# Patient Record
Sex: FEMALE | Race: OTHER | NOT HISPANIC OR LATINO | Employment: FULL TIME | URBAN - METROPOLITAN AREA
[De-identification: names, ages, dates, MRNs, and addresses within clinical notes are randomized per-mention and may not be internally consistent; named-entity substitution may affect disease eponyms.]

---

## 2021-10-14 ENCOUNTER — HOSPITAL ENCOUNTER (EMERGENCY)
Facility: MEDICAL CENTER | Age: 56
End: 2021-10-14
Attending: EMERGENCY MEDICINE
Payer: OTHER GOVERNMENT

## 2021-10-14 VITALS
TEMPERATURE: 97.4 F | OXYGEN SATURATION: 97 % | RESPIRATION RATE: 18 BRPM | SYSTOLIC BLOOD PRESSURE: 159 MMHG | DIASTOLIC BLOOD PRESSURE: 83 MMHG | HEIGHT: 72 IN | BODY MASS INDEX: 28.13 KG/M2 | WEIGHT: 207.67 LBS | HEART RATE: 61 BPM

## 2021-10-14 DIAGNOSIS — I82.492 ACUTE DEEP VEIN THROMBOSIS (DVT) OF OTHER SPECIFIED VEIN OF LEFT LOWER EXTREMITY (HCC): ICD-10-CM

## 2021-10-14 LAB
ALBUMIN SERPL BCP-MCNC: 3.8 G/DL (ref 3.2–4.9)
ALBUMIN/GLOB SERPL: 2.1 G/DL
ALP SERPL-CCNC: 103 U/L (ref 30–99)
ALT SERPL-CCNC: 24 U/L (ref 2–50)
ANION GAP SERPL CALC-SCNC: 15 MMOL/L (ref 7–16)
APTT PPP: 26.1 SEC (ref 24.7–36)
AST SERPL-CCNC: 22 U/L (ref 12–45)
BASOPHILS # BLD AUTO: 0.4 % (ref 0–1.8)
BASOPHILS # BLD: 0.03 K/UL (ref 0–0.12)
BILIRUB SERPL-MCNC: 0.7 MG/DL (ref 0.1–1.5)
BUN SERPL-MCNC: 14 MG/DL (ref 8–22)
CALCIUM SERPL-MCNC: 9.5 MG/DL (ref 8.4–10.2)
CHLORIDE SERPL-SCNC: 107 MMOL/L (ref 96–112)
CO2 SERPL-SCNC: 20 MMOL/L (ref 20–33)
CREAT SERPL-MCNC: 0.9 MG/DL (ref 0.5–1.4)
EOSINOPHIL # BLD AUTO: 0.3 K/UL (ref 0–0.51)
EOSINOPHIL NFR BLD: 4.2 % (ref 0–6.9)
ERYTHROCYTE [DISTWIDTH] IN BLOOD BY AUTOMATED COUNT: 57.2 FL (ref 35.9–50)
GLOBULIN SER CALC-MCNC: 1.8 G/DL (ref 1.9–3.5)
GLUCOSE SERPL-MCNC: 90 MG/DL (ref 65–99)
HCT VFR BLD AUTO: 39.9 % (ref 37–47)
HGB BLD-MCNC: 13.1 G/DL (ref 12–16)
IMM GRANULOCYTES # BLD AUTO: 0.03 K/UL (ref 0–0.11)
IMM GRANULOCYTES NFR BLD AUTO: 0.4 % (ref 0–0.9)
INR PPP: 1.07 (ref 0.87–1.13)
LYMPHOCYTES # BLD AUTO: 2.57 K/UL (ref 1–4.8)
LYMPHOCYTES NFR BLD: 36 % (ref 22–41)
MCH RBC QN AUTO: 30.8 PG (ref 27–33)
MCHC RBC AUTO-ENTMCNC: 32.8 G/DL (ref 33.6–35)
MCV RBC AUTO: 93.9 FL (ref 81.4–97.8)
MONOCYTES # BLD AUTO: 0.69 K/UL (ref 0–0.85)
MONOCYTES NFR BLD AUTO: 9.7 % (ref 0–13.4)
NEUTROPHILS # BLD AUTO: 3.51 K/UL (ref 2–7.15)
NEUTROPHILS NFR BLD: 49.3 % (ref 44–72)
NRBC # BLD AUTO: 0 K/UL
NRBC BLD-RTO: 0 /100 WBC
PLATELET # BLD AUTO: 235 K/UL (ref 164–446)
PMV BLD AUTO: 9.7 FL (ref 9–12.9)
POTASSIUM SERPL-SCNC: 4 MMOL/L (ref 3.6–5.5)
PROT SERPL-MCNC: 5.6 G/DL (ref 6–8.2)
PROTHROMBIN TIME: 13.1 SEC (ref 12–14.6)
RBC # BLD AUTO: 4.25 M/UL (ref 4.2–5.4)
SODIUM SERPL-SCNC: 142 MMOL/L (ref 135–145)
WBC # BLD AUTO: 7.1 K/UL (ref 4.8–10.8)

## 2021-10-14 PROCEDURE — 85730 THROMBOPLASTIN TIME PARTIAL: CPT

## 2021-10-14 PROCEDURE — 36415 COLL VENOUS BLD VENIPUNCTURE: CPT

## 2021-10-14 PROCEDURE — 80053 COMPREHEN METABOLIC PANEL: CPT

## 2021-10-14 PROCEDURE — 85610 PROTHROMBIN TIME: CPT

## 2021-10-14 PROCEDURE — 85025 COMPLETE CBC W/AUTO DIFF WBC: CPT

## 2021-10-14 PROCEDURE — 99283 EMERGENCY DEPT VISIT LOW MDM: CPT

## 2021-10-14 NOTE — ED TRIAGE NOTES
"Chief Complaint   Patient presents with   • Deep Vein Thrombosis     L side leg, located in groin, had US done at [facility] today that showed DVT; sent here for RX for blood thinners     /83   Pulse 61   Temp 36.3 °C (97.4 °F) (Temporal)   Resp 18   Ht 1.88 m (6' 2\")   Wt 94.2 kg (207 lb 10.8 oz)   SpO2 97%   BMI 26.66 kg/m²     Pt arrived w/ above concern.    Has this patient been vaccinated for COVID - NO  If not, would they like to be vaccinated while in the ER if eligible?  no  Would the patient like to speak with the ERP about the possibility of receiving the COVID vaccine today before making a decision? no    "

## 2021-10-14 NOTE — ED NOTES
"Pt pacing in room, stated \"has to be somewhere at a specific time.\"    Aware waiting for eval by ERP.   "

## 2021-10-14 NOTE — DISCHARGE INSTRUCTIONS
Deep Vein Thrombosis    Deep vein thrombosis (DVT) is a condition in which a blood clot forms in a deep vein, such as a lower leg, thigh, or arm vein. A clot is blood that has thickened into a gel or solid. This condition is dangerous. It can lead to serious and even life-threatening complications if the clot travels to the lungs and causes a blockage (pulmonary embolism). It can also damage veins in the leg. This can result in leg pain, swelling, discoloration, and sores (post-thrombotic syndrome).  What are the causes?  This condition may be caused by:  · A slowdown of blood flow.  · Damage to a vein.  · A condition that causes blood to clot more easily, such as an inherited clotting disorder.  What increases the risk?  The following factors may make you more likely to develop this condition:  · Being overweight.  · Being older, especially over age 60.  · Sitting or lying down for more than four hours.  · Being in the hospital.  · Lack of physical activity (sedentary lifestyle).  · Pregnancy, being in childbirth, or having recently given birth.  · Taking medicines that contain estrogen, such as medicines to prevent pregnancy.  · Smoking.  · A history of any of the following:  ? Blood clots or a blood clotting disease.  ? Peripheral vascular disease.  ? Inflammatory bowel disease.  ? Cancer.  ? Heart disease.  ? Genetic conditions that affect how your blood clots, such as Factor V Leiden mutation.  ? Neurological diseases that affect your legs (leg paresis).  ? A recent injury, such as a car accident.  ? Major or lengthy surgery.  ? A central line placed inside a large vein.  What are the signs or symptoms?  Symptoms of this condition include:  · Swelling, pain, or tenderness in an arm or leg.  · Warmth, redness, or discoloration in an arm or leg.  If the clot is in your leg, symptoms may be more noticeable or worse when you stand or walk. Some people may not develop any symptoms.  How is this diagnosed?  This  condition is diagnosed with:  · A medical history and physical exam.  · Tests, such as:  ? Blood tests. These are done to check how well your blood clots.  ? Ultrasound. This is done to check for clots.  ? Venogram. For this test, contrast dye is injected into a vein and X-rays are taken to check for any clots.  How is this treated?  Treatment for this condition depends on:  · The cause of your DVT.  · Your risk for bleeding or developing more clots.  · Any other medical conditions that you have.  Treatment may include:  · Taking a blood thinner (anticoagulant). This type of medicine prevents clots from forming. It may be taken by mouth, injected under the skin, or injected through an IV (catheter).  · Injecting clot-dissolving medicines into the affected vein (catheter-directed thrombolysis).  · Having surgery. Surgery may be done to:  ? Remove the clot.  ? Place a filter in a large vein to catch blood clots before they reach the lungs.  Some treatments may be continued for up to six months.  Follow these instructions at home:  If you are taking blood thinners:  · Take the medicine exactly as told by your health care provider. Some blood thinners need to be taken at the same time every day. Do not skip a dose.  · Talk with your health care provider before you take any medicines that contain aspirin or NSAIDs. These medicines increase your risk for dangerous bleeding.  · Ask your health care provider about foods and drugs that could change the way the medicine works (may interact). Avoid those things if your health care provider tells you to do so.  · Blood thinners can cause easy bruising and may make it difficult to stop bleeding. Because of this:  ? Be very careful when using knives, scissors, or other sharp objects.  ? Use an electric razor instead of a blade.  ? Avoid activities that could cause injury or bruising, and follow instructions about how to prevent falls.  · Wear a medical alert bracelet or carry a  card that lists what medicines you take.  General instructions  · Take over-the-counter and prescription medicines only as told by your health care provider.  · Return to your normal activities as told by your health care provider. Ask your health care provider what activities are safe for you.  · Wear compression stockings if recommended by your health care provider.  · Keep all follow-up visits as told by your health care provider. This is important.  How is this prevented?  To lower your risk of developing this condition again:  · For 30 or more minutes every day, do an activity that:  ? Involves moving your arms and legs.  ? Increases your heart rate.  · When traveling for longer than four hours:  ? Exercise your arms and legs every hour.  ? Drink plenty of water.  ? Avoid drinking alcohol.  · Avoid sitting or lying for a long time without moving your legs.  · If you have surgery or you are hospitalized, ask about ways to prevent blood clots. These may include taking frequent walks or using anticoagulants.  · Stay at a healthy weight.  · If you are a woman who is older than age 35, avoid unnecessary use of medicines that contain estrogen, such as some birth control pills.  · Do not use any products that contain nicotine or tobacco, such as cigarettes and e-cigarettes. This is especially important if you take estrogen medicines. If you need help quitting, ask your health care provider.  Contact a health care provider if:  · You miss a dose of your blood thinner.  · Your menstrual period is heavier than usual.  · You have unusual bruising.  Get help right away if:  · You have:  ? New or increased pain, swelling, or redness in an arm or leg.  ? Numbness or tingling in an arm or leg.  ? Shortness of breath.  ? Chest pain.  ? A rapid or irregular heartbeat.  ? A severe headache or confusion.  ? A cut that will not stop bleeding.  · There is blood in your vomit, stool, or urine.  · You have a serious fall or accident,  or you hit your head.  · You feel light-headed or dizzy.  · You cough up blood.  These symptoms may represent a serious problem that is an emergency. Do not wait to see if the symptoms will go away. Get medical help right away. Call your local emergency services (911 in the [place] Do not drive yourself to the hospital.  Summary  · Deep vein thrombosis (DVT) is a condition in which a blood clot forms in a deep vein, such as a lower leg, thigh, or arm vein.  · Symptoms can include swelling, warmth, pain, and redness in your leg or arm.  · This condition may be treated with a blood thinner (anticoagulant medicine), medicine that is injected to dissolve blood clots,compression stockings, or surgery.  · If you are prescribed blood thinners, take them exactly as told.  This information is not intended to replace advice given to you by your health care provider. Make sure you discuss any questions you have with your health care provider.  Document Released: 12/18/2006 Document Revised: 11/30/2018 Document Reviewed: 05/18/2018  [employer] Patient Education © 2020 [employer]

## 2021-10-14 NOTE — ED NOTES
Pt stated will stay long enough to have blood drawn, will leave ED and return in am discuss starting on Xarelto.  ERP aware of POC.  Pt to sign out AMA after blood drawn.

## 2021-10-14 NOTE — ED PROVIDER NOTES
"ED Provider Note    CHIEF COMPLAINT  Chief Complaint   Patient presents with   • Deep Vein Thrombosis     L side leg, located in groin, had US done at [facility] today that showed DVT; sent here for RX for blood thinners       HPI  Angela Corey is a 56 y.o. female who presents stating that she developed Covid over the last month, though symptoms have resolved.  Then she reports she had leg swelling 2 weeks ago that is improved slightly.  She denies chest pain or shortness of breath.  She went to [facility] today and there was diagnosed with extensive DVT of the left lower extremity.  The patient comes to our ER complaining of leg swelling, no chest pain, no hemoptysis, no shortness of breath.  She has never had a blood clot previously.    REVIEW OF SYSTEMS  See HPI for further details. All other systems are negative.     PAST MEDICAL HISTORY   Covid last month    SOCIAL HISTORY  Social History     Tobacco Use   • Smoking status: Never Smoker   • Smokeless tobacco: Never Used   Substance and Sexual Activity   • Alcohol use: Not Currently   • Drug use: Never   • Sexual activity: Not on file       SURGICAL HISTORY  patient denies any surgical history    CURRENT MEDICATIONS  Home Medications     Reviewed by Lisa Magallanes R.N. (Registered Nurse) on 10/14/21 at 1415  Med List Status: Not Addressed   Medication Last Dose Status        Patient Tommy Taking any Medications                       ALLERGIES  Not on File    FAMILY HISTORY  No pertinent family history    PHYSICAL EXAM  VITAL SIGNS: /83   Pulse 61   Temp 36.3 °C (97.4 °F) (Temporal)   Resp 18   Ht 1.88 m (6' 2\")   Wt 94.2 kg (207 lb 10.8 oz)   SpO2 97%   BMI 26.66 kg/m²  @TIM[[id number]::@   Pulse ox interpretation: I interpret this pulse ox as normal.  Constitutional: Alert in no apparent distress.  HENT: No signs of trauma, Bilateral external ears normal, Nose normal.   Eyes: Pupils are equal and reactive, Conjunctiva normal, Non-icteric. "   Neck: Normal range of motion, No tenderness, Supple, No stridor.   Lymphatic: No lymphadenopathy noted.   Cardiovascular: Regular rate and rhythm, no murmurs.   Thorax & Lungs: Normal breath sounds, No respiratory distress, No wheezing, No chest tenderness.   Abdomen: Bowel sounds normal, Soft, No tenderness, No masses, No pulsatile masses. No peritoneal signs.  Skin: Warm, Dry, No erythema, No rash.   Back: No bony tenderness, No CVA tenderness.   Extremities: Intact distal pulses, left lower extremity swelling compared with right.  Musculoskeletal: Good range of motion in all major joints. No tenderness to palpation or major deformities noted.   Neurologic: Alert , Normal motor function, Normal sensory function, No focal deficits noted.   Psychiatric: Affect normal, Judgment normal, Mood normal.       DIAGNOSTIC STUDIES / PROCEDURES        LABS  Labs Reviewed   CBC WITH DIFFERENTIAL - Abnormal; Notable for the following components:       Result Value    MCHC 32.8 (*)     RDW 57.2 (*)     All other components within normal limits    Narrative:     Indicate which anticoagulants the patient is on:->UNKNOWN   COMP METABOLIC PANEL - Abnormal; Notable for the following components:    Alkaline Phosphatase 103 (*)     Total Protein 5.6 (*)     Globulin 1.8 (*)     All other components within normal limits    Narrative:     Indicate which anticoagulants the patient is on:->UNKNOWN   PROTHROMBIN TIME    Narrative:     Indicate which anticoagulants the patient is on:->UNKNOWN   APTT    Narrative:     Indicate which anticoagulants the patient is on:->UNKNOWN   ESTIMATED GFR    Narrative:     Indicate which anticoagulants the patient is on:->UNKNOWN         RADIOLOGY  The radiology report from [facility] was faxed to us.  The patient has extensive DVT throughout the left lower extremity.  I will have Johana scan this into the media tab.          COURSE & MEDICAL DECISION MAKING  Pertinent Labs & Imaging studies  reviewed. (See chart for details)    The patient presents with DVT.  Labs were ordered prior to be given Xarelto.    The patient's white count is unremarkable.  Her H&H is normal.  Her differential is normal.  Her BUN and creatinine are normal.  Her INR and PTT are normal.    The patient would like to sign out AGAINST MEDICAL ADVICE.  I spoke at length with her about her concerns about taking Xarelto.  I told her that the benefits outweigh the risks for her, I am concerned she will develop a PE and possibly die.  She understands this.  Both me and nurse Dea talk to the patient at length.  We cannot convince her to stay or take Xarelto.    I discussed with the patient the risks of their decision to leave without receiving the appropriate medical care. I discussed with the patient the risks of their decision to refuse or withhold consent to receive appropriate medical care. The patient has the capacity to understand the risks and benefits described above. The patient is not intoxicated clinically, the patient's is alert and oriented and able to make a good decision in my opinion. I discussed alternative treatments with the patient. The patient was given discharge instructions and a followup plan as documented in the medical record. I have asked the patient to return at any time to the emergency department for any reason.    The patient will not drink alcohol nor drive with prescribed medications. The patient will return for worsening symptoms and is stable at the time of discharge. The patient verbalizes understanding and will comply.    I discussed with the patient the risks of their decision to leave without receiving the appropriate medical care. I discussed with the patient the risks of their decision to refuse or withhold consent to receive appropriate medical care. The patient has the capacity to understand the risks and benefits described above. The patient is not intoxicated clinically, the patient's is  alert and oriented and able to make a good decision in my opinion. I discussed alternative treatments with the patient. The patient was given discharge instructions and a followup plan as documented in the medical record. I have asked the patient to return at any time to the emergency department for any reason.      FINAL IMPRESSION  1.  DVT  2.   3.         Electronically signed by: Darrell Grant M.D., 10/14/2021 4:01 PM

## 2021-10-18 ENCOUNTER — DOCUMENTATION (OUTPATIENT)
Dept: VASCULAR LAB | Facility: MEDICAL CENTER | Age: 56
End: 2021-10-18

## 2021-10-18 NOTE — PROGRESS NOTES
[facility]    Received anticoagulation referral for Xarelto due to DVT from ER discharging physician Dr. Darrell Malik on 10/14/21    Per discharging provider note, pt left AMA and could not be convinced by MD or RN to stay or take Xarelto.    Called and LVM for pt to establish care. Will attempt to contact at a later date.      Insurance: [facility]  PCP: n/a  Locations to be seen: [address]    [facility] at [phone], fax [phone]    Puma Ibrahim, PharmD    Addendum:  Patient returned call and said that she will not come to [facility] for anything as she felt that she was being lied to and pressured with scare tactics and a second confirmatory US was denied, therefore left AMA.   She reports she subsequently went to different ED and they did a second ultrasound which NO CLOT was detectable.   She requests we take her off our call list as she will not be back to [facility].     Richard Llamas  PharmD